# Patient Record
Sex: MALE | URBAN - NONMETROPOLITAN AREA
[De-identification: names, ages, dates, MRNs, and addresses within clinical notes are randomized per-mention and may not be internally consistent; named-entity substitution may affect disease eponyms.]

---

## 2024-07-24 ENCOUNTER — PREP FOR PROCEDURE (OUTPATIENT)
Dept: OTOLARYNGOLOGY | Facility: HOSPITAL | Age: 39
End: 2024-07-24

## 2024-07-24 DIAGNOSIS — J34.3 HYPERTROPHY OF NASAL TURBINATES: ICD-10-CM

## 2024-07-24 DIAGNOSIS — J34.2 DEVIATED NASAL SEPTUM: Primary | ICD-10-CM

## 2024-07-24 NOTE — H&P
HISTORY AND PHYSICAL    DATE: .07/15/2024  PATIENT'S NAME: Heladio CONNER   :1985  SSN:     NP - interested in balloon    Nasal Congestion  Patient presents today for a new complaint of nasal congestion.  Location:  Left and right nostrils  Intensity:  Severe  Duration:  Onset in childhood.  Timing:  His congestion is constant and intensity varies.  Pattern of Development: Symptoms developed steadily.  Frequency: This is the only time patient has experienced these symptoms.  Context:  Change in Sx since onset: occurring more frequently lasting longer  Circumstances of onset: Nasal injury  Modifying Factors:  Aggravating Factors:  Patient denies any aggravating factors.  Relieving Factors:  Patient denies any relieving factors.  Associated Symptoms:  allergy sx in plant season.  Prior Testing:  Patient denies any prior testing or diagnostic procedures  Prior Treatment:  Patient denies any prior treatment  No Known Medications - No Dispense entered  History  History  Past Medical History:  Anxiety  Depression  Social History:  Home Living Situation: Alone  Tobacco Use    He smokes 1/2 pack day packs per day.  Exposed to second hand smoke: Yes  Cigarettes  Accepts transfusion of blood products: Yes  Immunizations are up to date    Constitution:  General Appearance: Well developed, No acute distress.  Ability to Communicate: Normal ability to communicate.  Head, Face, Salivary Glands, and TMJ  Inspection of Head and Face: No significant scars, No lesions present.  Head/Face Palpation: No tenderness to percussion or pressure, Normal skeletal contour and stability.  Facial Strength and Mobility: Facial strength and mobility normal on left, Facial strength and mobility normal on right.  Temporomandibular Joints: No deviation.  Ears  External Ears: Left Pinna: Normal helical rim, antithetical rim, conchal bowl, lobule, tragus, and external meatus., Right pinna: normal helical rim, antithetical rim, conchal bowl,  lobule, tragus, and external meatus..  Hearing Assessment: Normal to conversational voice.  Otoscopic Exam: Left external auditory canal normal, Left tympanic membrane normal. No middle ear effusion. [place] noted., Right external auditory canal normal, Right tympanic membrane normal. No middle ear effusion. [place] noted.  Nose  Nasal Interior: Nasal septum abnormal, 3+dev left inf and right whole length, Turbinates abnormal size or asymmetrical, 3-4+hypertrophy, No rhinorrhea, Normal mucosa with no swelling, polyps, active bleeding or evidence of bleeding.  External Nose: Nasal skin normal, Normal dorsum.  Mouth and Throat  Lips, Teeth, and Gums: Lips normal.  Base of Tongue: Base of tongue supple.  Vallecula: No edema, No erythema.  Larynx: General appearance normal, Normal epiglottis, False vocal cords normal, True vocal cords normal, Interarytenoid Space normal.  Oral Cavity and Oropharynx: Tonsillar regions normal, Soft palate normal, Posterior pharynx normal, Oral mucosa with normal color and moisture, No mucosal lesions, Anterior two thirds of tongue normal, Hard palate normal, Parotid duct puncta normal.  Hypopharyngeal Walls: Walls symmetrical.  Neck and Thyroid  Neck: Normal symmetry, Soft tissue crepitation, No palpable anterior or posterior lymphadenopathy, No neck masses, No skin lesions.  Thyroid: No hypertrophy.  Respiratory  Chest Inspection: No deformities noted, Normal expansion, Normal to percussion, Auscultation of lungs normal.  Respiratory Assessment: Effort normal.  Cardiovascular  Auscultation: Regular rate and rhythm, normal S1, S2, no murmur or abnormal sounds., No murmurs.  Lymphatic  Right Neck Nodes: Nontender to palpation, Normal consistency, Normal size.  Left Neck Nodes: Nontender to palpation, Normal consistency, Normal size.  Neurologic  Cranial Nerves: II-XII grossly intact and symmetrical.  Diagnosis  J342: Deviated nasal septum  J343: Hypertrophy of nasal turbinates  Treatment  Plan    Surgical Discussion:  Submucous resection of the septum and turbinate cautery were discussed in depth with risks, benefits, expectations, limitations, reasonable expected complications, including, but not limited to: bleeding, infection, persistent or recurrent nasal obstruction, scarring, septal perforation, need for future nasal surgery and/or treatment, anesthesia, etc. The patient understands and consents and is ready to proceed.  Comments:Heladio has a terribly deviated septum likely from injusry as a kid and has failed INS so surgery is next best option. He agreed.        Anton Mcneal M.D.